# Patient Record
Sex: MALE | Race: BLACK OR AFRICAN AMERICAN | Employment: PART TIME | URBAN - METROPOLITAN AREA
[De-identification: names, ages, dates, MRNs, and addresses within clinical notes are randomized per-mention and may not be internally consistent; named-entity substitution may affect disease eponyms.]

---

## 2020-03-11 ENCOUNTER — HOSPITAL ENCOUNTER (EMERGENCY)
Age: 45
Discharge: HOME | End: 2020-03-11
Attending: EMERGENCY MEDICINE
Payer: SELF-PAY

## 2020-03-11 VITALS
RESPIRATION RATE: 18 BRPM | WEIGHT: 254 LBS | BODY MASS INDEX: 36.36 KG/M2 | OXYGEN SATURATION: 99 % | TEMPERATURE: 98.4 F | HEART RATE: 84 BPM | HEIGHT: 70 IN

## 2020-03-11 DIAGNOSIS — H10.211 CHEMICAL CONJUNCTIVITIS OF RIGHT EYE: Primary | ICD-10-CM

## 2020-03-11 DIAGNOSIS — H16.9 KERATITIS: ICD-10-CM

## 2020-03-11 PROCEDURE — 74011000250 HC RX REV CODE- 250: Performed by: EMERGENCY MEDICINE

## 2020-03-11 PROCEDURE — 99283 EMERGENCY DEPT VISIT LOW MDM: CPT

## 2020-03-11 PROCEDURE — 74011250636 HC RX REV CODE- 250/636: Performed by: EMERGENCY MEDICINE

## 2020-03-11 RX ORDER — TETRACAINE HYDROCHLORIDE 5 MG/ML
1 SOLUTION OPHTHALMIC
Status: COMPLETED | OUTPATIENT
Start: 2020-03-11 | End: 2020-03-11

## 2020-03-11 RX ORDER — SODIUM CHLORIDE 9 MG/ML
100 INJECTION, SOLUTION INTRAVENOUS
Status: COMPLETED | OUTPATIENT
Start: 2020-03-11 | End: 2020-03-11

## 2020-03-11 RX ORDER — OXYCODONE AND ACETAMINOPHEN 5; 325 MG/1; MG/1
1 TABLET ORAL
Qty: 12 TAB | Refills: 0 | Status: SHIPPED | OUTPATIENT
Start: 2020-03-11 | End: 2020-03-14

## 2020-03-11 RX ORDER — OXYCODONE AND ACETAMINOPHEN 5; 325 MG/1; MG/1
1 TABLET ORAL
Qty: 12 TAB | Refills: 0 | Status: SHIPPED | OUTPATIENT
Start: 2020-03-11 | End: 2020-03-11

## 2020-03-11 RX ORDER — POLYMYXIN B SULFATE AND TRIMETHOPRIM 1; 10000 MG/ML; [USP'U]/ML
1 SOLUTION OPHTHALMIC EVERY 4 HOURS
Qty: 10 ML | Refills: 0 | Status: SHIPPED | OUTPATIENT
Start: 2020-03-11 | End: 2020-03-11

## 2020-03-11 RX ORDER — POLYMYXIN B SULFATE AND TRIMETHOPRIM 1; 10000 MG/ML; [USP'U]/ML
1 SOLUTION OPHTHALMIC EVERY 4 HOURS
Qty: 10 ML | Refills: 0 | Status: SHIPPED | OUTPATIENT
Start: 2020-03-11

## 2020-03-11 RX ADMIN — TETRACAINE HYDROCHLORIDE 1 DROP: 5 SOLUTION OPHTHALMIC at 18:08

## 2020-03-11 RX ADMIN — SODIUM CHLORIDE 1000 ML: 900 INJECTION, SOLUTION INTRAVENOUS at 19:47

## 2020-03-11 RX ADMIN — SODIUM CHLORIDE 100 ML/HR: 900 INJECTION, SOLUTION INTRAVENOUS at 18:08

## 2020-03-11 RX ADMIN — FLUORESCEIN SODIUM 1 STRIP: 1 STRIP OPHTHALMIC at 21:16

## 2020-03-11 NOTE — ED NOTES
Dr. Tamara Vazquez removed and replaced winnie lens for 2nd liter of irrigation. Patient tolerated well.

## 2020-03-11 NOTE — ED TRIAGE NOTES
Pt presents to ED with c/o chemical in right eye. 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

## 2020-03-11 NOTE — ED NOTES
Pt here for evaluation of rt eye irritation r/t chemical splash in eye. Pt sts he flushed his eye out prior to arrival and his eye flushed with NS upon arrival. Dr. Oliva Ramirez at bedside evaluating pt. Emergency Department Nursing Plan of Care       The Nursing Plan of Care is developed from the Nursing assessment and Emergency Department Attending provider initial evaluation. The plan of care may be reviewed in the ED Provider note.     The Plan of Care was developed with the following considerations:   Patient / Family readiness to learn indicated by:verbalized understanding  Persons(s) to be included in education: patient  Barriers to Learning/Limitations:No    Signed     Anupama Lou RN    3/11/2020   5:48 PM

## 2020-03-11 NOTE — ED PROVIDER NOTES
EMERGENCY DEPARTMENT HISTORY AND PHYSICAL EXAM      Date: 3/11/2020  Patient Name: Veronica Agosto    History of Presenting Illness     Chief Complaint   Patient presents with    Eye Pain     right     History Provided By: Patient    HPI: Veronica Agosto, 39 y.o. male with no past medical history who presents via private vehicle to the ED with cc of right eye pain after splashing dimethylethanolamine in his eye approximately 45 minutes prior to arrival.  Patient was at work and was using this substance when some of it splashed into his right eye. He complains of burning pain blurry vision, and increased tear production. He reports that he rinsed his eye out for approximately 20 minutes at work and then came to the emergency department for further evaluation. He denies using safety goggles or glasses when this event occurred. PMHx: None  Social Hx: Smokes 1/4 pack/day, occasional alcohol use, denies illegal drug use    PCP: None    There are no other complaints, changes, or physical findings at this time. No current facility-administered medications on file prior to encounter. No current outpatient medications on file prior to encounter. Past History     Past Medical History:  History reviewed. No pertinent past medical history. Past Surgical History:  History reviewed. No pertinent surgical history. Family History:  History reviewed. No pertinent family history. Social History:  Social History     Tobacco Use    Smoking status: Current Every Day Smoker     Packs/day: 0.25    Smokeless tobacco: Never Used   Substance Use Topics    Alcohol use: Yes    Drug use: Never     Allergies: Allergies   Allergen Reactions    Pork Derived (Porcine) Rash     Review of Systems   Review of Systems   Constitutional: Negative for chills and fever. HENT: Negative for congestion, rhinorrhea, sneezing and sore throat. Eyes: Positive for pain, discharge, redness and visual disturbance.    Respiratory: Negative for shortness of breath. Cardiovascular: Negative for chest pain and leg swelling. Gastrointestinal: Negative for abdominal pain, nausea and vomiting. Genitourinary: Negative for difficulty urinating and frequency. Musculoskeletal: Negative for back pain, myalgias and neck stiffness. Skin: Negative for rash. Neurological: Negative for dizziness, syncope, weakness and headaches. Hematological: Negative for adenopathy. All other systems reviewed and are negative. Physical Exam   Physical Exam  Vitals signs and nursing note reviewed. Constitutional:       Appearance: Normal appearance. He is well-developed. HENT:      Head: Normocephalic and atraumatic. Eyes:      General: Lids are normal. No visual field deficit. Right eye: No foreign body. Extraocular Movements: Extraocular movements intact. Conjunctiva/sclera:      Right eye: Right conjunctiva is injected. Chemosis present. No exudate. Comments: Increased tear production from the right eye   Neck:      Musculoskeletal: Full passive range of motion without pain, normal range of motion and neck supple. Cardiovascular:      Rate and Rhythm: Normal rate and regular rhythm. Pulses: Normal pulses. Heart sounds: Normal heart sounds. No murmur. Pulmonary:      Effort: Pulmonary effort is normal. No respiratory distress. Breath sounds: Normal breath sounds. Chest:      Chest wall: No tenderness. Abdominal:      General: Bowel sounds are normal.      Palpations: Abdomen is soft. Tenderness: There is no abdominal tenderness. There is no guarding or rebound. Skin:     General: Skin is warm and dry. Findings: No erythema or rash. Neurological:      Mental Status: He is alert and oriented to person, place, and time. Psychiatric:         Speech: Speech normal.         Behavior: Behavior normal.         Thought Content:  Thought content normal.         Judgment: Judgment normal.       Diagnostic Study Results   Labs -   No results found for this or any previous visit (from the past 12 hour(s)). Radiologic Studies -   No orders to display     No results found. Medical Decision Making   I am the first provider for this patient. I reviewed the vital signs, available nursing notes, past medical history, past surgical history, family history and social history. Vital Signs-Reviewed the patient's vital signs. Patient Vitals for the past 12 hrs:   Temp Pulse Resp SpO2   03/11/20 1733 98.4 °F (36.9 °C) 84 18 99 %     Pulse Oximetry Analysis - 99% on RA    Records Reviewed: Nursing Notes    Provider Notes (Medical Decision Making):   53-year-old male presents with right eye pain, redness, and increased to production after a toxic substance was splashed in his eye at work. Will discuss with poison control. Will use tetracaine and rinsed the eye with a Jarad lens and recheck his tear pH after irrigation. ED Course:   Initial assessment performed. The patients presenting problems have been discussed, and they are in agreement with the care plan formulated and outlined with them. I have encouraged them to ask questions as they arise throughout their visit. Progress Note  5:49 PM  I have spoken with Poison control[facility]. They recommend treating pain with Tetracaine and irrigating with 2L NS through Lakshmi Joaquin lens and then checking for ulceration with Fluoroscein stain. BEDSIDE SIGN OUT:  7:32 PM  Discussed pt's hx, disposition, and available diagnostic and imaging results with Dr. Augusto Hallman. Reviewed care plans. Both providers and patient are in agreement with care plan. Gavin Padilla MD is transferring care at this time. ED Course as of Mar 11 2134   Wed Mar 11, 2020   2132 Fluorescein exam just completed after 2 L normal saline irrigation Via Jarad lens. Patient has no definite abrasion.   He does have diffuse punctate uptake of inferior conjunctival and persistent pain in both the medial and lateral corner of his eye. Will treat with topical antibiotic for keratitis. Will refer to Ophtho    [SS]      ED Course User Index  [SS] Kailee Tena MD       Progress Note:   Updated pt on all returned results and findings. Discussed the importance of proper follow up as referred below along with return precautions. Pt in agreement with the care plan and expresses agreement with and understanding of all items discussed. Disposition:  home    PLAN:  1. There are no discharge medications for this patient. 2.   Follow-up Information     Follow up With Specialties Details Why Contact Info    Flakita Cardenas MD Ophthalmology Schedule an appointment as soon as possible for a visit  [address] [address]  [phone]      [address] [place] [address] DEPT Emergency Medicine  As needed, If symptoms worsen [address] [facility]  [phone]        Return to ED if worse     Diagnosis     Clinical Impression:   1. Chemical conjunctivitis of right eye    2. Keratitis            Please note that this dictation was completed with Dragon, computer voice recognition software. Quite often unanticipated grammatical, syntax, homophones, and other interpretive errors are inadvertently transcribed by the computer software. Please disregard these errors. Additionally, please excuse any errors that have escaped final proofreading.

## 2020-03-12 NOTE — ED NOTES
Discharge instructions were given to the patient by Tej Zaragoza RN. The patient left the Emergency Department ambulatory, alert and oriented and in no acute distress with 2 prescriptions. The patient was encouraged to call or return to the ED for worsening issues or problems and was encouraged to schedule a follow up appointment for continuing care. The patient verbalized understanding of discharge instructions and prescriptions, all questions were answered. The patient has no further concerns at this time.

## 2023-05-12 RX ORDER — POLYMYXIN B SULFATE AND TRIMETHOPRIM 1; 10000 MG/ML; [USP'U]/ML
1 SOLUTION OPHTHALMIC EVERY 4 HOURS
COMMUNITY
Start: 2020-03-11

## 2025-03-17 ENCOUNTER — APPOINTMENT (OUTPATIENT)
Facility: HOSPITAL | Age: 50
End: 2025-03-17

## 2025-03-17 ENCOUNTER — HOSPITAL ENCOUNTER (EMERGENCY)
Facility: HOSPITAL | Age: 50
Discharge: HOME | End: 2025-03-17
Attending: STUDENT IN AN ORGANIZED HEALTH CARE EDUCATION/TRAINING PROGRAM

## 2025-03-17 VITALS
DIASTOLIC BLOOD PRESSURE: 92 MMHG | OXYGEN SATURATION: 98 % | WEIGHT: 265 LBS | HEART RATE: 79 BPM | HEIGHT: 70 IN | RESPIRATION RATE: 14 BRPM | SYSTOLIC BLOOD PRESSURE: 140 MMHG | TEMPERATURE: 98.7 F | BODY MASS INDEX: 37.94 KG/M2

## 2025-03-17 DIAGNOSIS — S92.351A CLOSED DISPLACED FRACTURE OF FIFTH METATARSAL BONE OF RIGHT FOOT, INITIAL ENCOUNTER: Primary | ICD-10-CM

## 2025-03-17 LAB
APPEARANCE UR: CLEAR
BACTERIA URNS QL MICRO: NEGATIVE /HPF
BILIRUB UR QL: NEGATIVE
COLOR UR: ABNORMAL
EPITH CASTS URNS QL MICRO: ABNORMAL /LPF
GLUCOSE UR STRIP.AUTO-MCNC: NEGATIVE MG/DL
HGB UR QL STRIP: NEGATIVE
HYALINE CASTS URNS QL MICRO: ABNORMAL /LPF (ref 0–2)
KETONES UR QL STRIP.AUTO: NEGATIVE MG/DL
LEUKOCYTE ESTERASE UR QL STRIP.AUTO: ABNORMAL
NITRITE UR QL STRIP.AUTO: NEGATIVE
PH UR STRIP: 6 (ref 5–8)
PROT UR STRIP-MCNC: NEGATIVE MG/DL
RBC #/AREA URNS HPF: ABNORMAL /HPF (ref 0–5)
SP GR UR REFRACTOMETRY: 1.01
URINE CULTURE IF INDICATED: ABNORMAL
UROBILINOGEN UR QL STRIP.AUTO: 0.2 EU/DL (ref 0.2–1)
WBC URNS QL MICRO: ABNORMAL /HPF (ref 0–4)

## 2025-03-17 PROCEDURE — 81001 URINALYSIS AUTO W/SCOPE: CPT

## 2025-03-17 PROCEDURE — 99284 EMERGENCY DEPT VISIT MOD MDM: CPT

## 2025-03-17 PROCEDURE — 73610 X-RAY EXAM OF ANKLE: CPT

## 2025-03-17 PROCEDURE — 73630 X-RAY EXAM OF FOOT: CPT

## 2025-03-17 ASSESSMENT — PAIN - FUNCTIONAL ASSESSMENT: PAIN_FUNCTIONAL_ASSESSMENT: PREVENTS OR INTERFERES WITH MANY ACTIVE NOT PASSIVE ACTIVITIES

## 2025-03-17 ASSESSMENT — PAIN DESCRIPTION - ORIENTATION: ORIENTATION: RIGHT

## 2025-03-17 ASSESSMENT — PAIN SCALES - GENERAL: PAINLEVEL_OUTOF10: 9

## 2025-03-17 ASSESSMENT — PAIN DESCRIPTION - LOCATION: LOCATION: ANKLE

## 2025-03-17 ASSESSMENT — PAIN DESCRIPTION - PAIN TYPE: TYPE: ACUTE PAIN

## 2025-03-17 NOTE — DISCHARGE INSTRUCTIONS
Thank You!    It was a pleasure taking care of you in our Emergency Department today. We know that when you come to our Emergency Department, you are entrusting us with your health, comfort, and safety. Our physicians and nurses honor that trust, and truly appreciate the opportunity to care for you and your loved ones.      We also value your feedback. If you receive a survey about your Emergency Department experience today, please fill it out.  We care about our patients' feedback, and we listen to what you have to say.  Thank you.    Justin Aguilar MD  ________________________________________________________________________  I have included a copy of your lab results and/or radiologic studies from today's visit so you can have them easily available at your follow-up visit. We hope you feel better and please do not hesitate to contact the ED if you have any questions at all!    No results found for this or any previous visit (from the past 12 hours).  XR FOOT RIGHT (MIN 3 VIEWS)   Final Result   Acute fracture base of fifth metatarsal.      Electronically signed by Paul Lundy      XR ANKLE RIGHT (MIN 3 VIEWS)   Final Result   Acute fracture base of fifth metatarsal.      Electronically signed by Paul Lundy          The exam and treatment you received in the Emergency Department were for an urgent problem and are not intended as complete care. It is important that you follow up with a doctor, nurse practitioner, or physician assistant for ongoing care. If your symptoms become worse or you do not improve as expected and you are unable to reach your usual health care provider, you should return to the Emergency Department. We are available 24 hours a day.    Please take your discharge instructions with you when you go to your follow-up appointment.     If a prescription has been provided, please have it filled as soon as possible to prevent a delay in treatment. Read the entire medication instruction sheet

## 2025-03-17 NOTE — ED NOTES
Provided pt with two warm blankets. Pt is resting comfortably in stretcher with call bell within reach. Updated pt that MD is waiting for UA results.

## 2025-03-17 NOTE — ED PROVIDER NOTES
[facility] EMERGENCY DEPARTMENT  EMERGENCY DEPARTMENT ENCOUNTER       Pt Name: Pb Snider  MRN: [id number]  Birthdate 1975  Date of evaluation: 3/17/2025  Provider: Justin Aguilar MD   PCP: No primary care provider on file.  Note Started: 7:28 AM 3/17/25     CHIEF COMPLAINT       Chief Complaint   Patient presents with    Ankle Pain        HISTORY OF PRESENT ILLNESS: 1 or more elements      History From: Patient  None     Pb Snider is a 50 y.o. male who presents to the emergency department with right foot pain.  Patient states he walked down a step and stepped awkwardly on Saturday.  He had immediate pain in his right foot which has been worsening.  Pain is most severe along the lateral aspect of his right foot and on plantar surface of the foot.  Patient denies any other injuries from the fall.  Patient also notes that he has foul-smelling urine and has prior history of UTI.     Nursing Notes were all reviewed and agreed with or any disagreements were addressed in the HPI.     REVIEW OF SYSTEMS      Review of Systems     Positives and Pertinent negatives as per HPI.    PAST HISTORY     Past Medical History:  No past medical history on file.      Past Surgical History:  No past surgical history on file.    Family History:  No family history on file.    Social History:  Social History     Tobacco Use    Smoking status: Every Day     Current packs/day: 0.25     Types: Cigarettes    Smokeless tobacco: Never   Substance Use Topics    Alcohol use: Yes    Drug use: Never       Allergies:  No Known Allergies    CURRENT MEDICATIONS      Previous Medications    TRIMETHOPRIM-POLYMYXIN B (POLYTRIM) 91160-0.1 UNIT/ML-% OPHTHALMIC SOLUTION    Apply 1 drop to eye every 4 hours         PHYSICAL EXAM      ED Triage Vitals [03/17/25 0554]   Encounter Vitals Group      BP (!) 142/86      Systolic BP Percentile       Diastolic BP Percentile       Pulse 88      Respirations 14      Temp 98.7 °F (37.1 °C)      Temp Source

## 2025-03-17 NOTE — ED NOTES
Bedside and Verbal shift change report given to Alexandria (oncoming nurse) by Romel and Kaylee (offgoing nurse). Report included the following information Nurse Handoff Report, ED Encounter Summary, ED SBAR, Adult Overview, MAR, Recent Results, and Neuro Assessment.

## 2025-03-17 NOTE — ED NOTES
Provided crutches education and watched pt ambulate safely with crutches. Walking boot placed on right foot. Patient discharged by DEVON Ibrahim . Patient provided with discharge instructions Rx and instructions on follow up care. Patient ambulatory on crutches out of ED. No acute distress noted

## 2025-03-17 NOTE — ED NOTES
Assumed care of patient at this time. Pt placed on monitor x2, bed rail raised, and call bell within reach. Patient is alert and oriented. No acute distress noted at this time.

## 2025-03-17 NOTE — ED TRIAGE NOTES
Wheeled through triage with R ankle pain since GLF while stepping down one step on Saturday. Drop foot at baseline since but reports he's never fallen before. He describes it feeling \"like a ball is under the arch\". Denies any other injuries.

## 2025-03-17 NOTE — ED NOTES
Bedside shift change report given to Alexandria (oncoming nurse) by Zonia (offgoing nurse). Report included the following information Adult Overview, MAR, and Recent Results.

## 2025-03-19 ENCOUNTER — OFFICE VISIT (OUTPATIENT)
Age: 50
End: 2025-03-19

## 2025-03-19 DIAGNOSIS — S92.354A CLOSED NONDISPLACED FRACTURE OF FIFTH METATARSAL BONE OF RIGHT FOOT, INITIAL ENCOUNTER: Primary | ICD-10-CM

## 2025-03-19 PROCEDURE — 99203 OFFICE O/P NEW LOW 30 MIN: CPT | Performed by: STUDENT IN AN ORGANIZED HEALTH CARE EDUCATION/TRAINING PROGRAM

## 2025-03-19 ASSESSMENT — PATIENT HEALTH QUESTIONNAIRE - PHQ9
SUM OF ALL RESPONSES TO PHQ QUESTIONS 1-9: 0
SUM OF ALL RESPONSES TO PHQ QUESTIONS 1-9: 0
1. LITTLE INTEREST OR PLEASURE IN DOING THINGS: NOT AT ALL
SUM OF ALL RESPONSES TO PHQ QUESTIONS 1-9: 0
SUM OF ALL RESPONSES TO PHQ QUESTIONS 1-9: 0
2. FEELING DOWN, DEPRESSED OR HOPELESS: NOT AT ALL
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
## 2025-03-19 NOTE — PROGRESS NOTES
Identified pt with two pt identifiers (name and ). Reviewed chart in preparation for visit and have obtained necessary documentation.    Pb Snider is a 50 y.o. male New Patient (Right Foot )  .    There were no vitals filed for this visit.       1. Have you been to the ER, urgent care clinic since your last visit?  Hospitalized since your last visit?  yes - ER     2. Have you seen or consulted any other health care providers outside of the [facility] since your last visit?  Include any pap smears or colon screening.  no

## 2025-04-30 ENCOUNTER — OFFICE VISIT (OUTPATIENT)
Age: 50
End: 2025-04-30
Payer: MEDICAID

## 2025-04-30 VITALS — BODY MASS INDEX: 38.02 KG/M2 | HEIGHT: 70 IN

## 2025-04-30 DIAGNOSIS — S92.354A CLOSED NONDISPLACED FRACTURE OF FIFTH METATARSAL BONE OF RIGHT FOOT, INITIAL ENCOUNTER: Primary | ICD-10-CM

## 2025-04-30 PROCEDURE — 99213 OFFICE O/P EST LOW 20 MIN: CPT | Performed by: STUDENT IN AN ORGANIZED HEALTH CARE EDUCATION/TRAINING PROGRAM

## 2025-04-30 RX ORDER — IBUPROFEN 200 MG
TABLET ORAL EVERY 6 HOURS PRN
COMMUNITY

## 2025-04-30 RX ORDER — NAPROXEN SODIUM 220 MG/1
220 TABLET, FILM COATED ORAL 2 TIMES DAILY WITH MEALS
COMMUNITY

## 2025-04-30 ASSESSMENT — PATIENT HEALTH QUESTIONNAIRE - PHQ9
SUM OF ALL RESPONSES TO PHQ QUESTIONS 1-9: 0
1. LITTLE INTEREST OR PLEASURE IN DOING THINGS: NOT AT ALL
SUM OF ALL RESPONSES TO PHQ QUESTIONS 1-9: 0
2. FEELING DOWN, DEPRESSED OR HOPELESS: NOT AT ALL
SUM OF ALL RESPONSES TO PHQ QUESTIONS 1-9: 0
SUM OF ALL RESPONSES TO PHQ QUESTIONS 1-9: 0

## 2025-04-30 NOTE — PROGRESS NOTES
51 yo male   Follow up of right fifth metatarsal base fracture after rolling ankle when going down a step. Pain is improving much. He is able to ambulate well in the boot. He notes swelling in the foot has developed.     On exam.   Diffuse foot swelling  Nontender over the base of the 5th metatarsal.   Wiggles toes   Decreased sensation slightly more than his baseline per patient.   Foot wwp.     Xrays   XR FOOT RIGHT (MIN 3 VIEWS)  Fifth metatarsal base fracture again visualized. No interval displacement.   Slight sclerosis seen on the lateral view. Callus seen on the AP view.        Assessment:  Healing 5th metatarsal base fracture, sustained 3/15    Plan:   Wean from boot   PT for swelling, gait  WBAT  Follow up in 6 weeks   Compression stocking recommended.       Gemma Thorpe MD  Orthopedic Surgery - Sports Medicine

## 2025-05-13 ENCOUNTER — HOSPITAL ENCOUNTER (OUTPATIENT)
Facility: HOSPITAL | Age: 50
Setting detail: RECURRING SERIES
Discharge: HOME | End: 2025-05-16
Attending: STUDENT IN AN ORGANIZED HEALTH CARE EDUCATION/TRAINING PROGRAM